# Patient Record
Sex: FEMALE | Race: WHITE | Employment: FULL TIME | URBAN - METROPOLITAN AREA
[De-identification: names, ages, dates, MRNs, and addresses within clinical notes are randomized per-mention and may not be internally consistent; named-entity substitution may affect disease eponyms.]

---

## 2021-01-16 ENCOUNTER — HOSPITAL ENCOUNTER (EMERGENCY)
Facility: CLINIC | Age: 66
Discharge: HOME | End: 2021-01-16
Attending: EMERGENCY MEDICINE | Admitting: EMERGENCY MEDICINE
Payer: COMMERCIAL

## 2021-01-16 ENCOUNTER — APPOINTMENT (OUTPATIENT)
Dept: ULTRASOUND IMAGING | Facility: CLINIC | Age: 66
End: 2021-01-16
Attending: EMERGENCY MEDICINE
Payer: COMMERCIAL

## 2021-01-16 VITALS
DIASTOLIC BLOOD PRESSURE: 87 MMHG | RESPIRATION RATE: 16 BRPM | OXYGEN SATURATION: 96 % | SYSTOLIC BLOOD PRESSURE: 138 MMHG | TEMPERATURE: 97.9 F | HEART RATE: 69 BPM

## 2021-01-16 DIAGNOSIS — I80.02 THROMBOPHLEBITIS OF SUPERFICIAL VEINS OF LEFT LOWER EXTREMITY: ICD-10-CM

## 2021-01-16 PROCEDURE — 99284 EMERGENCY DEPT VISIT MOD MDM: CPT | Mod: 25 | Performed by: EMERGENCY MEDICINE

## 2021-01-16 PROCEDURE — 93971 EXTREMITY STUDY: CPT | Mod: 26

## 2021-01-16 PROCEDURE — 93971 EXTREMITY STUDY: CPT | Mod: LT

## 2021-01-16 PROCEDURE — 99284 EMERGENCY DEPT VISIT MOD MDM: CPT | Performed by: EMERGENCY MEDICINE

## 2021-01-16 RX ORDER — CEPHALEXIN 500 MG/1
500 CAPSULE ORAL 3 TIMES DAILY
Qty: 21 CAPSULE | Refills: 0 | Status: SHIPPED | OUTPATIENT
Start: 2021-01-16 | End: 2021-01-23

## 2021-01-16 ASSESSMENT — ENCOUNTER SYMPTOMS
MYALGIAS: 1
SHORTNESS OF BREATH: 0

## 2021-01-16 NOTE — ED PROVIDER NOTES
[facility] EMERGENCY DEPARTMENT ([facility])  1/16/21  History     Chief Complaint   Patient presents with     Leg Pain     Deep Vein Thrombosis     The history is provided by the patient.     Marilyn Browne is a 65 year old female with no medical history on file who presents to the Emergency Department for evaluation of leg pain and concerns for DVT.  Patient reports a left lower leg pain and tenderness.  She states that these symptoms started on Thursday (1/14).  She reports experiencing similar symptoms with her history of DVT in 2016 (was on Warfarin).  Currently at th ED, she rates the pain as a 5/10 in intensity.  She denies any recent history of long travel or leg trauma.  No shortness of breath, chest pain, or gait problem. Patient reports a medical history of GERD, ulcer, and DVT. Patient notes that her insurance was recently changed.     I have reviewed the Medications, Allergies, Past Medical and Surgical History, and Social History in the [employer] system.  PAST MEDICAL HISTORY: No past medical history on file.    PAST SURGICAL HISTORY: No past surgical history on file.    Past medical history, past surgical history, medications, and allergies were reviewed with the patient. Additional pertinent items: DVT, triggered by immobilization during a long car ride.     FAMILY HISTORY: No family history on file.    SOCIAL HISTORY:   Social History     Tobacco Use     Smoking status: Not on file   Substance Use Topics     Alcohol use: Not on file     Social history was reviewed with the patient. Additional pertinent items: No alcohol or tobacco use      Discharge Medication List as of 1/16/2021  5:09 PM      START taking these medications    Details   cephALEXin (KEFLEX) 500 MG capsule Take 1 capsule (500 mg) by mouth 3 times daily for 7 days, Disp-21 capsule, R-0, Local Print              No Known Allergies     Review of Systems   Respiratory: Negative for shortness of breath.    Cardiovascular: Negative  for chest pain.   Musculoskeletal: Positive for myalgias (left lower leg). Negative for gait problem.        Positive for left lower leg pain and tenderness     A complete review of systems was performed with pertinent positives and negatives noted in the HPI, and all other systems negative.    Physical Exam   BP: (!) 132/99  Pulse: 71  Temp: 97.9  F (36.6  C)  Resp: 16  SpO2: 100 %      Physical Exam  Gen:A&Ox3, no acute distress  HEENT:PERRL, no facial tenderness or wounds, head atraumatic, oropharynx clear, mucous membranes moist, TMs clear bilaterally  Neck:no bony tenderness or step offs, no JVD, trachea midline  Back: no CVA tenderness, no midline bony tenderness  CV:RRR without murmurs  PULM:Clear to auscultation bilaterally, speaking in full sentences and with normal work of brathing   Abd:soft, nontender, nondistended. Bowel sounds present and normal  UE:No traumatic injuries, skin normal  LE:no traumatic injuries, left lower calf patch of erythema with mild warmth. Possible cord palpated  Neuro:CN II-XII intact, strength 5/5 throughout          ED Course        Procedures                           No results found for this or any previous visit (from the past 24 hour(s)).  Medications - No data to display          Assessments & Plan (with Medical Decision Making)   64 yo F presenting with erythema of the left anterolateral calf. Hx of previous DVT, superficial thrombophlebitis, and varicose veins.   Arrives afebrile and hemodynamically stable.   Without sign or symptoms of PE.   LE US negative for DVT of the left leg.   Pt's exam is more suggestive of superficial thrombophlebitis, but early cellulitis is also possible.   Will monitor and treat conservatively for 24 hours with heat, NSAIDs and elevation. If rapidly worsening or failing to improve pt was given a wait-and-see prescription for a 7-day course of keflex.   Return instructions in the event of new fever or significant worsening reviewed.    Discharged.     I have reviewed the nursing notes.    I have reviewed the findings, diagnosis, plan and need for follow up with the patient.    Discharge Medication List as of 1/16/2021  5:09 PM      START taking these medications    Details   cephALEXin (KEFLEX) 500 MG capsule Take 1 capsule (500 mg) by mouth 3 times daily for 7 days, Disp-21 capsule, R-0, Local Print             Final diagnoses:   Thrombophlebitis of superficial veins of left lower extremity     I, Shani Romero, am serving as a trained medical scribe to document services personally performed by Eileen Polanco MD, based on the provider's statements to me.     I, Eileen Polanco MD, was physically present and have reviewed and verified the accuracy of this note documented by Shani Romero.    Eileen Polanco MD FACEP  1/16/2021   [facility] EMERGENCY DEPARTMENT     Eileen Polanco MD  01/18/21 1136

## 2021-01-16 NOTE — ED TRIAGE NOTES
Triage Assessment & Note:      Patient presents with: PT c/o left lower leg pain and has had a DVT in the right leg in the past and is concerned for the same now. PT also reports a red area to the lower anterior left leg.  PT denies SOB, ABC's WDL    Home Treatments/Remedies: None    Febrile / Afebrile? Afebrile     Duration of C/o: 2 days     Lito Mariscal RN  January 16, 2021

## 2021-01-16 NOTE — ED AVS SNAPSHOT
[facility] Emergency Department  [address] [address]  Phone: [phone]                                    Marilyn Browne   MRN: [id number]    Department: [facility] Emergency Department   Date of Visit: 1/16/2021           After Visit Summary Signature Page    I have received my discharge instructions, and my questions have been answered. I have discussed any challenges I see with this plan with the nurse or doctor.    ..........................................................................................................................................  Patient/Patient Representative Signature      ..........................................................................................................................................  Patient Representative Print Name and Relationship to Patient    ..................................................               ................................................  Date                                   Time    ..........................................................................................................................................  Reviewed by Signature/Title    ...................................................              ..............................................  Date                                               Time          22EPIC Rev 08/18

## 2021-03-05 ENCOUNTER — HOSPITAL ENCOUNTER (OUTPATIENT)
Dept: MAMMOGRAPHY | Facility: CLINIC | Age: 66
Discharge: HOME | End: 2021-03-05
Attending: INTERNAL MEDICINE | Admitting: INTERNAL MEDICINE
Payer: COMMERCIAL

## 2021-03-05 DIAGNOSIS — Z12.31 OTHER SCREENING MAMMOGRAM: ICD-10-CM

## 2021-03-05 PROCEDURE — 77063 BREAST TOMOSYNTHESIS BI: CPT

## 2022-02-21 NOTE — DISCHARGE INSTRUCTIONS
Thank you for coming to the [facility] Emergency Department.     Please treat the leg as a superficial thrombophlebitis for the next 24 hours (see the attached information). If clearly getting worse, or you develop a fever >100.4F, start the antibiotic (Keflex) for skin infection (cellulitis).     Return to the ER is pain or redness are worsening quickly.         
no abdominal pain, no constipation, no diarrhea, no nausea and no vomiting.